# Patient Record
Sex: FEMALE | Race: WHITE
[De-identification: names, ages, dates, MRNs, and addresses within clinical notes are randomized per-mention and may not be internally consistent; named-entity substitution may affect disease eponyms.]

---

## 2021-08-12 ENCOUNTER — HOSPITAL ENCOUNTER (EMERGENCY)
Dept: HOSPITAL 75 - ER | Age: 31
LOS: 1 days | Discharge: HOME | End: 2021-08-13
Payer: MEDICARE

## 2021-08-12 DIAGNOSIS — N39.0: ICD-10-CM

## 2021-08-12 DIAGNOSIS — Z3A.01: ICD-10-CM

## 2021-08-12 DIAGNOSIS — O20.0: Primary | ICD-10-CM

## 2021-08-12 LAB
APTT PPP: YELLOW S
BACTERIA #/AREA URNS HPF: (no result) /HPF
BILIRUB UR QL STRIP: NEGATIVE
FIBRINOGEN PPP-MCNC: CLEAR MG/DL
GLUCOSE UR STRIP-MCNC: NEGATIVE MG/DL
HCT VFR BLD CALC: 47 % (ref 35–52)
HGB BLD-MCNC: 15.6 G/DL (ref 11.5–16)
KETONES UR QL STRIP: (no result)
LEUKOCYTE ESTERASE UR QL STRIP: (no result)
MCH RBC QN AUTO: 31 PG (ref 25–34)
MCHC RBC AUTO-ENTMCNC: 33 G/DL (ref 32–36)
MCV RBC AUTO: 94 FL (ref 80–99)
NITRITE UR QL STRIP: NEGATIVE
PH UR STRIP: 7 [PH] (ref 5–9)
PLATELET # BLD: 243 10^3/UL (ref 130–400)
PMV BLD AUTO: 11.4 FL (ref 9–12.2)
PROT UR QL STRIP: (no result)
RBC #/AREA URNS HPF: (no result) /HPF
SP GR UR STRIP: 1.02 (ref 1.02–1.02)
SQUAMOUS #/AREA URNS HPF: (no result) /HPF
WBC # BLD AUTO: 20.8 10^3/UL (ref 4.3–11)
WBC #/AREA URNS HPF: (no result) /HPF

## 2021-08-12 PROCEDURE — 36415 COLL VENOUS BLD VENIPUNCTURE: CPT

## 2021-08-12 PROCEDURE — 96372 THER/PROPH/DIAG INJ SC/IM: CPT

## 2021-08-12 PROCEDURE — 86901 BLOOD TYPING SEROLOGIC RH(D): CPT

## 2021-08-12 PROCEDURE — 81000 URINALYSIS NONAUTO W/SCOPE: CPT

## 2021-08-12 PROCEDURE — 86900 BLOOD TYPING SEROLOGIC ABO: CPT

## 2021-08-12 PROCEDURE — 84702 CHORIONIC GONADOTROPIN TEST: CPT

## 2021-08-12 PROCEDURE — 85027 COMPLETE CBC AUTOMATED: CPT

## 2021-08-12 PROCEDURE — 87088 URINE BACTERIA CULTURE: CPT

## 2021-08-12 NOTE — ED GU-FEMALE
General


Chief Complaint:  Female Reproductive


Stated Complaint:  16 W PREG / VAGINAL BLEEDING


Nursing Triage Note:  


Pt had a spot of blood on the toilet tissue and she reports she is 6 wks 


pregnant. Denies cramping.





Allergies and Home Medications


Allergies


Coded Allergies:  


     etodolac (Verified  Allergy, Unknown, 14)


     meloxicam (Verified  Allergy, Unknown, 14)


     metformin (Verified  Allergy, Unknown, 14)





Home Medications


Docusate Sodium 100 Mg Cap, 100 MG PO BID


   Prescribed by: HAMMAD MATHIS on 14


Nitrofurantoin Monohyd/M-Cryst 100 Mg Capsule, 1 TAB PO BID


   Prescribed by: REBECCA HERNANDEZ on 21


Omega-3/Dha/Epa/Fish Oil 1 Each Capsule.dr, 1 EACH PO DAILY, (Reported)


Pantoprazole Sodium 20 Mg Tablet.dr, 40 MG PO DAILY


   Prescribed by: HAMMAD MATHIS on 14


[vitex]  , 1 TAB PO DAILY, (Reported)





Review of Systems


Review of Systems


Expected Date of Delivery:  2022





Past Medical-Social-Family Hx


Patient Social History


Tobacco Use?:  No


Substance use?:  No


Alcohol Use?:  No


Pt feels they are or have been:  No





Immunizations Up To Date


Tetanus Booster (TDap):  More than 5yrs





Seasonal Allergies


Seasonal Allergies:  No





Past Medical History


Expected Date of Delivery:  2022


Last Menstrual Period:  2021


Reproductive Disorders:  Yes (PCOS)


Female Reproductive Disorders:  Polycystic Ovarian Dis


Sexually Transmitted Disease:  No


HIV/AIDS:  No


Bladder Infection


Personality Disorder


Adverse Reaction/Blood Tranf:  No





Family Medical History


No Pertinent Family Hx





Physical Exam


Vital Signs





Vital Signs - First Documented








 21





 22:29


 


Temp 36.8


 


Pulse 76


 


Resp 18


 


B/P (MAP) 123/92 (102)


 


Pulse Ox 100


 


O2 Delivery Room Air





Capillary Refill : Less Than 3 Seconds


Height, Weight, BMI


Height: 5'8"


Weight: 261lbs. oz. 118.813363qn;  BMI


Method:Stated





Progress/Results/Core Measures


Suspected Sepsis


SIRS


Temperature: 


Pulse: 76 


Respiratory Rate: 18


 


Laboratory Tests


21 23:13: White Blood Count 20.8H


Blood Pressure 123 /92 


Mean: 102


 


Laboratory Tests


21 23:13: Platelet Count 243








Results/Orders


Lab Results





Laboratory Tests








Test


 21


23:13 21


23:16 Range/Units


 


 


White Blood Count


 20.8 H


 


 4.3-11.0


10^3/uL


 


Red Blood Count


 5.04 


 


 3.80-5.11


10^6/uL


 


Hemoglobin 15.6   11.5-16.0  g/dL


 


Hematocrit 47   35-52  %


 


Mean Corpuscular Volume 94   80-99  fL


 


Mean Corpuscular Hemoglobin 31   25-34  pg


 


Mean Corpuscular Hemoglobin


Concent 33 


 


 32-36  g/dL





 


Red Cell Distribution Width 12.3   10.0-14.5  %


 


Platelet Count


 243 


 


 130-400


10^3/uL


 


Mean Platelet Volume 11.4   9.0-12.2  fL


 


Human Chorionic Gonadotropin,


Quant [id number] H


 


 <5  MIU/ML





 


Urine Color  YELLOW   


 


Urine Clarity  CLEAR   


 


Urine pH  7.0  5-9  


 


Urine Specific Gravity  1.020  1.016-1.022  


 


Urine Protein  TRACE H NEGATIVE  


 


Urine Glucose (UA)  NEGATIVE  NEGATIVE  


 


Urine Ketones  1+ H NEGATIVE  


 


Urine Nitrite  NEGATIVE  NEGATIVE  


 


Urine Bilirubin  NEGATIVE  NEGATIVE  


 


Urine Urobilinogen  1.0  < = 1.0  MG/DL


 


Urine Leukocyte Esterase  TRACE H NEGATIVE  


 


Urine RBC (Auto)  3+ H NEGATIVE  


 


Urine RBC  RARE   /HPF


 


Urine WBC  5-10 H  /HPF


 


Urine Squamous Epithelial


Cells 


 5-10 


  /HPF





 


Urine Crystals  NONE   /LPF


 


Urine Bacteria  LARGE H  /HPF


 


Urine Casts  NONE   /LPF


 


Urine Mucus  SMALL H  /LPF


 


Urine Culture Indicated  YES   








My Orders





Orders - REBECCA HERNANDEZ DO


Cbc No Diff (21 22:51)


Hcg,Quantitative (21 22:51)


Ua Culture If Indicated (21 22:51)


Abo Rh Type (21 22:51)


Urine Culture (21 23:16)


Rh Immune Globulin Rhophylac (21 23:40)


Rhogam Administration (21 23:40)





Vital Signs/I&O











 21





 22:29


 


Temp 36.8


 


Pulse 76


 


Resp 18


 


B/P (MAP) 123/92 (102)


 


Pulse Ox 100


 


O2 Delivery Room Air





Capillary Refill : Less Than 3 Seconds








Blood Pressure Mean:                    102











Departure


Impression





   Primary Impression:  


   Threatened  in first trimester


   Additional Impressions:  


   Rh negative status during pregnancy in first trimester


   Urinary tract infection


Disposition:  01 HOME, SELF-CARE


Condition:  Stable





Departure-Patient Inst.


Decision time for Depature:  00:05


Referrals:  


RAYA TEIXEIRA DO (PCP/Family)


Primary Care Physician


Patient Instructions:  Bleeding in Early Pregnancy ED, Pregnancy in Rh-Negative 

People, Rho(D) Immune Globulin, Threatened Miscarriage (DC), Urinary Tract 

Infection, Adult (DC)





Add. Discharge Instructions:  


LOTS OF CLEAR LIQUIDS





TYLENOL AS NEEDED FOR PAIN 





NOTHING IN THE VAGINA--NO TAMPONS, DOUCHING OR INTERCOURSE





KEEP AN ACCURATE PAD COUNT--RETURN IF SOAKING MORE THAN 1 MAXI PAD AN HOUR





CONTACT OR OB DR TOMORROW FOR FURTHER CARE





All discharge instructions reviewed with patient and/or family. Voiced 

understanding.


Scripts


Nitrofurantoin Monohyd/M-Cryst (Macrobid 100 mg Capsule) 100 Mg Capsule


1 TAB PO BID, #20 CAP


   Prov: REBECCA HERNANDEZ DO         21











REBECCA HERNANDEZ DO                 Aug 12, 2021 23:51

## 2021-08-13 VITALS — DIASTOLIC BLOOD PRESSURE: 75 MMHG | SYSTOLIC BLOOD PRESSURE: 117 MMHG

## 2021-08-13 VITALS — SYSTOLIC BLOOD PRESSURE: 114 MMHG | DIASTOLIC BLOOD PRESSURE: 73 MMHG

## 2021-08-13 VITALS — DIASTOLIC BLOOD PRESSURE: 73 MMHG | SYSTOLIC BLOOD PRESSURE: 114 MMHG

## 2021-12-30 ENCOUNTER — HOSPITAL ENCOUNTER (EMERGENCY)
Dept: HOSPITAL 75 - ER | Age: 31
Discharge: HOME | End: 2021-12-30
Payer: MEDICARE

## 2021-12-30 ENCOUNTER — HOSPITAL ENCOUNTER (OUTPATIENT)
Dept: HOSPITAL 75 - LDRP | Age: 31
End: 2021-12-30
Attending: FAMILY MEDICINE
Payer: MEDICARE

## 2021-12-30 VITALS — DIASTOLIC BLOOD PRESSURE: 72 MMHG | SYSTOLIC BLOOD PRESSURE: 122 MMHG

## 2021-12-30 VITALS — SYSTOLIC BLOOD PRESSURE: 114 MMHG | DIASTOLIC BLOOD PRESSURE: 76 MMHG

## 2021-12-30 VITALS — BODY MASS INDEX: 34.82 KG/M2 | HEIGHT: 67.99 IN | WEIGHT: 229.72 LBS

## 2021-12-30 DIAGNOSIS — Z3A.26: ICD-10-CM

## 2021-12-30 DIAGNOSIS — O26.893: Primary | ICD-10-CM

## 2021-12-30 DIAGNOSIS — J44.9: ICD-10-CM

## 2021-12-30 DIAGNOSIS — J06.9: ICD-10-CM

## 2021-12-30 DIAGNOSIS — Z3A.00: ICD-10-CM

## 2021-12-30 DIAGNOSIS — O99.412: ICD-10-CM

## 2021-12-30 DIAGNOSIS — O99.512: Primary | ICD-10-CM

## 2021-12-30 DIAGNOSIS — F15.90: ICD-10-CM

## 2021-12-30 DIAGNOSIS — R07.89: ICD-10-CM

## 2021-12-30 DIAGNOSIS — R07.81: ICD-10-CM

## 2021-12-30 DIAGNOSIS — Z20.822: ICD-10-CM

## 2021-12-30 LAB
APTT PPP: (no result) S
BACTERIA #/AREA URNS HPF: (no result) /HPF
BARBITURATES UR QL: NEGATIVE
BENZODIAZ UR QL SCN: NEGATIVE
BILIRUB UR QL STRIP: NEGATIVE
COCAINE UR QL: NEGATIVE
FIBRINOGEN PPP-MCNC: (no result) MG/DL
GLUCOSE UR STRIP-MCNC: NEGATIVE MG/DL
KETONES UR QL STRIP: NEGATIVE
LEUKOCYTE ESTERASE UR QL STRIP: NEGATIVE
METHADONE UR QL SCN: NEGATIVE
METHAMPHETAMINE SCREEN URINE S: POSITIVE
NITRITE UR QL STRIP: NEGATIVE
OPIATES UR QL SCN: NEGATIVE
OXYCODONE UR QL: NEGATIVE
PH UR STRIP: 6 [PH] (ref 5–9)
PROPOXYPH UR QL: NEGATIVE
PROT UR QL STRIP: NEGATIVE
RBC #/AREA URNS HPF: (no result) /HPF
SP GR UR STRIP: >=1.03 (ref 1.02–1.02)
SQUAMOUS #/AREA URNS HPF: (no result) /HPF
TRICYCLICS UR QL SCN: NEGATIVE
WBC #/AREA URNS HPF: (no result) /HPF

## 2021-12-30 PROCEDURE — 81000 URINALYSIS NONAUTO W/SCOPE: CPT

## 2021-12-30 PROCEDURE — 80306 DRUG TEST PRSMV INSTRMNT: CPT

## 2021-12-30 PROCEDURE — 87088 URINE BACTERIA CULTURE: CPT

## 2021-12-30 PROCEDURE — 99212 OFFICE O/P EST SF 10 MIN: CPT

## 2021-12-30 PROCEDURE — 87636 SARSCOV2 & INF A&B AMP PRB: CPT

## 2021-12-30 NOTE — ED COUGH/URI
General


Chief Complaint:  COVID19 Suspect/Confirmed


Stated Complaint:  RIB PAIN


Source:  patient





History of Present Illness


Date Seen by Provider:  Dec 30, 2021


Time Seen by Provider:  04:28


Initial Comments


PT ARRIVES VIA WHEELCHAIR FROM OB DEPT


PT IS 26 WEEKS PREGNANT AND LABOR HAS BEEN RULED OUT


PT C/O COUGH AND CONGESTION X 2 WEEKS, WORSE X 1 WEEK


NO FEVER


NO LOSS OF TASTE/SMELL


NO SORE THROAT


NO GI SYMPTOMS


NO HEADACHE


NO BODY ACHES


STATES SHE COUGHED TONIGHT AROUND 0100 AND FELT HER LEFT LOWER RIB POP AND NOW 

HAS SEVERE PAIN IN RIB AREA


HAS NOT TAKEN ANYTHING FOR PAIN --STATES "TYLENOL IS THE ONLY THING I CAN TAKE",

BUT HAS NOT TAKEN ANY 


NO SHORTNESS OF BREATH, JUST HURTS TO COUGH OR TAKE A DEEP BREATH OR MOVE


HAS NOT TAKEN ANYTHING FOR COUGH--STATES "ALL I CAN TAKE IS ROBITUSSIN" , BUT 

HAS NOT TAKEN ANY


STATES SHE HAS AN INHALER BUT HAS NOT USED IT


STATES SHE WAS DX WITH A SINUS INFECTION "3 DAYS AGO" BY DR. TEIXEIRA,  AND 

PRESCRIBED AMOXIL--STATES RX WAS FOR 7 DAYS AND SHE HAS 4 DAYS LEFT


PT STATES SHE HAD A NEGATIVE COVID-19 TEST 3 DAYS AGO


PT HAS HAD MODERNA COVID-19  VACCINE X 2  03/2021





PER MED RECONCILIATION, PT HAS BEEN PRESCRIBED AMOXIL ON 12/02/21, HYDROCODONE #

120 TABLETS ON 12/09/21 AND COMBIVENT INHALER ON 12/21/21--ALL BY DR. TEIXEIRA





PCP DR. TEIXEIRA





OB: [facility]





Allergies and Home Medications


Allergies


Coded Allergies:  


     Fish Containing Products (Verified  Allergy, Unknown, 12/30/21)


     egg (Verified  Allergy, Unknown, 12/30/21)


     etodolac (Verified  Allergy, Unknown, 12/13/14)


     meloxicam (Verified  Allergy, Unknown, 12/13/14)


     metformin (Verified  Allergy, Unknown, 12/13/14)





Patient Home Medication List


Home Medication List Reviewed:  Yes


Fluticasone Propionate (Flonase Allergy Relief) 9.9 Ml [place].susp, 2 SPRAY NS 

DAILY


   Prescribed by: REBECCA HERNANDEZ on 12/30/21 0532


Prenatal Vit W-Ca,Fe,FA(<1 mg) (Prenatal Formula) 1 Each Tablet, 1 EACH PO 

DAILY, (Reported)


   Entered as Reported by: VIRGINIA REDDING on 12/30/21 0354


Discontinued Medications


Docusate Sodium (Colace) 100 Mg Cap, 100 MG PO BID


   Discontinued Reason: No Longer Taking


   Prescribed by: HAMMAD MATHIS on 12/13/14 2143


Nitrofurantoin Monohyd/M-Cryst (Macrobid 100 mg Capsule) 100 Mg Capsule, 1 TAB 

PO BID


   Discontinued Reason: No Longer Taking


   Prescribed by: REBECCA HERNANDEZ on 8/12/21 2350


Omega-3/Dha/Epa/Fish Oil (Fish Oil 1,000 Mg Ec Softgel) 1 Each Capsule.dr, 1 

EACH PO DAILY, (Reported)


   Discontinued Reason: No Longer Taking


   Entered as Reported by: DAVIE CULP on 12/13/14 2015


Pantoprazole Sodium (Protonix) 20 Mg Tablet.dr, 40 MG PO DAILY


   Discontinued Reason: No Longer Taking


   Prescribed by: HAMMAD MATHIS on 12/13/14 2143


[vitex]  , 1 TAB PO DAILY, (Reported)


   Discontinued Reason: No Longer Taking


   Entered as Reported by: DAVIE CULP on 12/13/14 2015





Review of Systems


Review of Systems


Constitutional:  no symptoms reported; No fever


EENTM:  see HPI, nose congestion


Respiratory:  see HPI, cough; No short of breath


Cardiovascular:  see HPI


Gastrointestinal:  no symptoms reported


Genitourinary:  no symptoms reported


Pregnant:  Yes


Musculoskeletal:  see HPI


Skin:  no symptoms reported


Psychiatric/Neurological:  No Symptoms Reported


Hematologic/Lymphatic:  No Symptoms Reported


Immunological/Allergic:  no symptoms reported





Past Medical-Social-Family Hx


Patient Social History


Tobacco Use?:  No


Additional substance use comme:  DENIES USE, BUT UDS + FOR METHAMPHETAMINES 

12/30/21 WHILE PREGNANT





Immunizations Up To Date


Tetanus Booster (TDap):  More than 5yrs





Seasonal Allergies


Seasonal Allergies:  No





Past Medical History


Surgery/Hospitalization HX:  


RIGHT THUMB SURGERY


Surgeries:  Yes


Orthopedic


Respiratory:  Yes


COPD


Cardiac:  No


Neurological:  No


Reproductive Disorders:  Yes (PCOS)


Female Reproductive Disorders:  Ovarian Cyst, Polycystic Ovarian Dis


Sexually Transmitted Disease:  No


HIV/AIDS:  No


Genitourinary:  Yes


Bladder Infection


Gastrointestinal:  No


Musculoskeletal:  Yes (RIGHT THUMB SURGERY)


Chronic Back Pain


Endocrine:  No


HEENT:  No


Cancer:  No


Psychosocial:  Yes (EXTENSIVE PSYCH ISSUES, SUICIDAL)


Anxiety, PTSD, Suicide Attempts, Personality Disorder, Depression


Integumentary:  No


Blood Disorders:  No


Adverse Reaction/Blood Tranf:  No





Family Medical History


No Pertinent Family Hx





Physical Exam





Vital Signs - First Documented




















Capillary Refill :


Height: 5'8"


Weight: 261lbs. oz. 118.909022qm;  BMI


Method:Stated


General Appearance:  WD/WN, no apparent distress, other (MOVES SLOWLY AND 

DRAMATICALLY. PINK HAIR. UNKEMPT.  )


HEENT:  PERRL/EOMI, TMs normal, pharynx normal, other (NASAL CONGESTION, CLEAR 

POST NASAL DRAINGE)


Neck:  normal inspection


Respiratory:  normal breath sounds, no respiratory distress, no accessory muscle

 use, other (TENDERNESS TO LEFT LATERAL / LOWER RIBS. NO EXTERNAL EVIDENCE OF 

TRAUMA. NO CREPITANCE OR SUB Q AIR. )


Cardiovascular:  regular rate, rhythm, no murmur


Gastrointestinal:  non tender, soft, other (GRAVID UTERUS)


Extremities:  normal inspection, no pedal edema


Neurologic/Psychiatric:  no motor/sensory deficits, alert, oriented x 3


Skin:  normal color, warm/dry; No rash; tattoos/piercings





Progress/Results/Core Measures


Suspected Sepsis


SIRS


Temperature: 


Pulse:  


Respiratory Rate: 


 


Blood Pressure  / 


Mean:





Results/Orders


Lab Results





Laboratory Tests








Test


 12/30/21


03:25 12/30/21


04:37 Range/Units


 


 


Urine Opiates Screen NEGATIVE   NEGATIVE  


 


Urine Oxycodone Screen NEGATIVE   NEGATIVE  


 


Urine Methadone Screen NEGATIVE   NEGATIVE  


 


Urine Propoxyphene Screen NEGATIVE   NEGATIVE  


 


Urine Barbiturates Screen NEGATIVE   NEGATIVE  


 


Ur Tricyclic Antidepressants


Screen NEGATIVE 


 


 NEGATIVE  





 


Urine Phencyclidine Screen NEGATIVE   NEGATIVE  


 


Urine Amphetamines Screen NEGATIVE   NEGATIVE  


 


Urine Methamphetamines Screen POSITIVE H  NEGATIVE  


 


Urine Benzodiazepines Screen NEGATIVE   NEGATIVE  


 


Urine Cocaine Screen NEGATIVE   NEGATIVE  


 


Urine Cannabinoids Screen NEGATIVE   NEGATIVE  


 


Influenza Type A (RT-PCR)  Not Detected  Not Detecte  


 


Influenza Type B (RT-PCR)  Not Detected  Not Detecte  


 


SARS-CoV-2 RNA (RT-PCR)  Not Detected  Not Detecte  








My Orders





Orders - REBECCA HERNANDEZ DO


Covid 19 Inhouse Test (12/30/21 04:19)


Influenza A And B By Pcr (12/30/21 04:19)


Isolation Central Supply Req (12/30/21 04:19)


Drug Screen Stat (Urine) (12/30/21 05:33)





Vital Signs/I&O











 12/30/21 12/30/21





 04:26 04:26


 


Temp 36.7 


 


Pulse 84 


 


Resp 18 


 


B/P (MAP) 124/82 (96) 


 


Pulse Ox 98 


 


O2 Delivery Room Air Room Air





Capillary Refill :


Progress Note :  


Progress Note


PLACED IN ISOLATION ROOM


PPE WORN AT ALL TIMES


COVID-19 TESTING PERFORMED





NO COUGH


NO DYSPNEA


NO HYPOXIA


NO FEVER





DISCUSSED ANTICIPATED COURSE AND RETURN/FOLLOW UP PRECAUTIONS





OF NOTE, UDS + FOR METHAMPHETAMINES, NEGATIVE FOR OPIATES ( DESPITE RECENT RX 

FOR #120 HYDROCODONE ON 12/09/21 )





Departure


Impression





   Primary Impression:  


   Upper respiratory infection


   Additional Impressions:  


   Left-sided chest wall pain


   26 weeks gestation of pregnancy


   Methamphetamine use


Disposition:  01 HOME, SELF-CARE


Condition:  Stable





Departure-Patient Inst.


Decision time for Depature:  05:30


Referrals:  


RAYA TEIXEIRA DO (PCP/Family)


Primary Care Physician


Patient Instructions:  Upper Respiratory Infection ED





Add. Discharge Instructions:  


ALTERNATE ICE AND HEAT TO SORE AREA AT 20 MINUTE INTERVALS





TAKE OVER THE COUNTER MEDICATIONS FOR COUGH, AS ADVISED BY YOUR OB DR. 





TAKE TYLENOL 1 GRAM 4 TIMES A DAY FOR PAIN 





LOTS OF CLEAR LIQUIDS





CONTINUE YOUR AMOXICILLIN AS PRESCRIBED





FOLLOW UP WITH YOUR DR IN 4-5 DAYS IF NO BETTER





All discharge instructions reviewed with patient and/or family. Voiced under

standing.


Scripts


Fluticasone Propionate (Flonase Allergy Relief) 9.9 Ml [place].susp


2 SPRAY NS DAILY, #1 EACH


   2 SPRAYS PER NOSTRIL DAILY X 2 DAYS THEN 1 SPRAY DAILY


   Prov: REBECCA HERNANDEZ DO         12/30/21











REBECCA HERNANDEZ DO                 Dec 30, 2021 04:43

## 2021-12-31 NOTE — PHYSICIAN QUERY-FINAL DX
JOHN,JAN 12/31/21 0836:


Clinic Account Progress/Dx


Physician Query:


Please give diagnosis





Please include # weeks gestation


Date of Service





Dec 30, 2021 at 03:16





JULIANNA HAELY MD 1/1/22 1333:


Clinic Account Progress/Dx


DIAGNOSIS:


Diagnosis


1.  IUP in 3rd trimester, non laborf


2.. Chest wall pain











JOHN,YAMINI                      Dec 31, 2021 08:36


JULIANNA HEALY MD               Jan 1, 2022 13:33